# Patient Record
Sex: MALE | Race: NATIVE HAWAIIAN OR OTHER PACIFIC ISLANDER | NOT HISPANIC OR LATINO | Employment: STUDENT | URBAN - METROPOLITAN AREA
[De-identification: names, ages, dates, MRNs, and addresses within clinical notes are randomized per-mention and may not be internally consistent; named-entity substitution may affect disease eponyms.]

---

## 2023-06-11 ENCOUNTER — HOSPITAL ENCOUNTER (EMERGENCY)
Facility: CLINIC | Age: 23
Discharge: HOME | End: 2023-06-12
Attending: EMERGENCY MEDICINE | Admitting: EMERGENCY MEDICINE
Payer: COMMERCIAL

## 2023-06-11 DIAGNOSIS — L05.01 PILONIDAL ABSCESS: ICD-10-CM

## 2023-06-11 PROCEDURE — 10060 I&D ABSCESS SIMPLE/SINGLE: CPT

## 2023-06-11 PROCEDURE — 99283 EMERGENCY DEPT VISIT LOW MDM: CPT

## 2023-06-11 PROCEDURE — 250N000009 HC RX 250: Performed by: EMERGENCY MEDICINE

## 2023-06-11 RX ADMIN — Medication 3 ML: at 22:58

## 2023-06-11 ASSESSMENT — ACTIVITIES OF DAILY LIVING (ADL): ADLS_ACUITY_SCORE: 33

## 2023-06-12 VITALS
WEIGHT: 260 LBS | TEMPERATURE: 99.3 F | BODY MASS INDEX: 32.33 KG/M2 | HEIGHT: 75 IN | OXYGEN SATURATION: 97 % | HEART RATE: 95 BPM | SYSTOLIC BLOOD PRESSURE: 129 MMHG | DIASTOLIC BLOOD PRESSURE: 85 MMHG | RESPIRATION RATE: 16 BRPM

## 2023-06-12 PROCEDURE — 250N000013 HC RX MED GY IP 250 OP 250 PS 637: Performed by: EMERGENCY MEDICINE

## 2023-06-12 PROCEDURE — 87070 CULTURE OTHR SPECIMN AEROBIC: CPT | Performed by: EMERGENCY MEDICINE

## 2023-06-12 PROCEDURE — 87077 CULTURE AEROBIC IDENTIFY: CPT | Performed by: EMERGENCY MEDICINE

## 2023-06-12 RX ORDER — DOXYCYCLINE 100 MG/1
100 CAPSULE ORAL 2 TIMES DAILY
Qty: 20 CAPSULE | Refills: 0 | Status: SHIPPED | OUTPATIENT
Start: 2023-06-12 | End: 2023-06-22

## 2023-06-12 RX ORDER — DOXYCYCLINE 100 MG/1
100 CAPSULE ORAL ONCE
Status: COMPLETED | OUTPATIENT
Start: 2023-06-12 | End: 2023-06-12

## 2023-06-12 RX ADMIN — DOXYCYCLINE HYCLATE 100 MG: 100 CAPSULE ORAL at 00:07

## 2023-06-12 NOTE — ED TRIAGE NOTES
Patient presents with nickel sized area of redness and swelling near sacrum that started 2 days ago.  Denies fevers.     Triage Assessment     Row Name 06/11/23 8590       Triage Assessment (Adult)    Airway WDL WDL       Respiratory WDL    Respiratory WDL WDL       Skin Circulation/Temperature WDL    Skin Circulation/Temperature WDL WDL       Cardiac WDL    Cardiac WDL WDL       Peripheral/Neurovascular WDL    Peripheral Neurovascular WDL WDL       Cognitive/Neuro/Behavioral WDL    Cognitive/Neuro/Behavioral WDL WDL

## 2023-06-12 NOTE — ED NOTES
Pt provided extensive education regarding wound care and future practices regarding avoiding infection.  Pt. Verbalized understanding and thanked writer.

## 2023-06-12 NOTE — ED PROVIDER NOTES
"  History   Chief Complaint:  Wound Check    The history is provided by the patient.      Dg Lucero is a 23 year old male who presents for a wound check. The patient reports that he has had a red yang on the top of his buttock for a few days which has been increasing in pain. He called his nurse line who advised him to come in to be seen. No fever, chills, or nausea. He states that nothing provoked it but he does have some acne around it.  No prior skin or staph infections.    Review of External Notes:   Dermatology note reviewed from July 1, 2022 and the patient was seen for screening.    Medications:    The patient is currently on no regular medications.     Past Medical History:    The patient denies any past medical history.      Physical Exam     Patient Vitals for the past 24 hrs:   BP Temp Temp src Pulse Resp SpO2 Height Weight   06/12/23 0014 129/85 -- -- 95 16 -- -- --   06/11/23 2241 (!) 143/67 99.3  F (37.4  C) Temporal 90 16 97 % 1.905 m (6' 3\") 117.9 kg (260 lb)      Physical Exam  Constitutional:       General: He is not in acute distress.     Appearance: Normal appearance. He is not toxic-appearing.   HENT:      Head: Atraumatic.   Eyes:      General: No scleral icterus.     Conjunctiva/sclera: Conjunctivae normal.   Cardiovascular:      Rate and Rhythm: Normal rate.      Heart sounds: Normal heart sounds.   Pulmonary:      Effort: Pulmonary effort is normal. No respiratory distress.   Abdominal:      Palpations: Abdomen is soft.      Tenderness: There is no abdominal tenderness.   Musculoskeletal:         General: No deformity.      Cervical back: Neck supple.   Skin:     General: Skin is warm.      Capillary Refill: Capillary refill takes less than 2 seconds.      Comments: There is a pilonidal abscess at the superior gluteal cleft with surrounding erythema and tenderness.   Neurological:      General: No focal deficit present.      Mental Status: He is alert and oriented to person, place, and " time.   Psychiatric:         Mood and Affect: Mood normal.         Behavior: Behavior normal.           Emergency Department Course     Procedures     Incision and Drainage     Procedure: Incision and Drainage     Consent: Verbal    Indication: Abscess    Location: Pilonidal abscess    Size: 2 cm    Preparation: Povidone-iodine     Anesthesia/Sedation: Topical -LET and Lidocaine - 1%     Procedure Detail:      Incision Type: Single straight  Scalpel: 11  Lesion Management: Probed and deloculated   Wound Management: Drain placed   Packing: Gauze, 1/2 inch iodoform     Patient Status: The patient tolerated the procedure well: Yes. There were no complications.    Emergency Department Course & Assessments:  Interventions:  Medications   lido-EPINEPHrine-tetracaine (LET) topical gel GEL (3 mLs Topical $Given 6/11/23 2258)   doxycycline hyclate (VIBRAMYCIN) capsule 100 mg (100 mg Oral $Given 6/12/23 0007)      Assessments:  2252 I obtained history and examined the patient as reported above.   2346 I rechecked the patient and completed an incision and drainage. See full procedure note above.     Disposition:  The patient was discharged to home.     Impression & Plan    Medical Decision Making:  This patient presents with a pilonidal abscess.  The area was anesthetized and he underwent I&D as described.  There was a copious amount of purulent material expressed.  Loculations were broken up and the wound was packed.  He was advised to follow-up in 2 days for removal of packing and recheck of the wound.  He was initiated on doxycycline.  Wound culture was sent.    Diagnosis:    ICD-10-CM    1. Pilonidal abscess  L05.01            Discharge Medications:  New Prescriptions    DOXYCYCLINE HYCLATE (VIBRAMYCIN) 100 MG CAPSULE    Take 1 capsule (100 mg) by mouth 2 times daily for 10 days      Scribe Disclosure:  Jorge AYALA, rishi serving as a scribe at 10:55 PM on 6/11/2023 to document services personally performed by  Dominick Medina MD based on my observations and the provider's statements to me.      6/11/2023   Dominick Medina MD McRoberts, Sean Edward, MD  06/12/23 0021

## 2023-06-12 NOTE — DISCHARGE INSTRUCTIONS
Follow-up with your primary care clinic, return to the ER, or go to urgent care clinic in 2 days for reevaluation of the abscess and removal of the packing material.    Return to the ER immediately for fever, increased pain, increased swelling or redness around the wound, or for any other concerns.    If the packing material inadvertently falls out on its own you should leave it out.

## 2023-06-13 ENCOUNTER — TELEPHONE (OUTPATIENT)
Dept: EMERGENCY MEDICINE | Facility: CLINIC | Age: 23
End: 2023-06-13
Payer: COMMERCIAL

## 2023-06-13 NOTE — TELEPHONE ENCOUNTER
[facility] Emergency Department Lab result notification     Caller/Patient name  Dg    Reason for call  Patient requesting lab result    Lab Result   Wound Aerobic culture still in progress  Current symptoms    Recommendations/Instructions  Discussed how he would receive positive results and that negative results are not called to patient. He will contact [facility] to access his results.    Contact your PCP clinic or return to the Emergency department if your:    Symptoms return.    Symptoms do not improve after 3 days on antibiotic.    Symptoms do not resolve after completing antibiotic.    Symptoms worsen or other concerning symptom's.      Mere Barron, SUNNI  [facility]  Emergency Dept Lab Result RN  Ph# [phone]     Copy of Lab result

## 2023-06-13 NOTE — TELEPHONE ENCOUNTER
[facility] Emergency Department/Urgent Care Lab result notification:    Reason for call    Notify the patient/parent of lab results    Assess patient symptoms (if applicable)    Review ED providers recommendations/discharge instructions (if necessary)    Advise per [facility] ED lab result protocol    Lab result  Patient requesting wound culture report if there are any updates as he is going to the [facility] urgent care for re-check and packing removal.  Relayed preliminary growth of Streptococcus constellatus so that he may provide this information to his provider. Did express that this is preliminary only and other bacteria maybe present upon final, and patient stated understanding.  Mere Barron, RN  [facility] Result RN  [facility] Emergency Dept Lab Result RN  Ph# [phone]

## 2023-06-19 ENCOUNTER — TELEPHONE (OUTPATIENT)
Dept: EMERGENCY MEDICINE | Facility: CLINIC | Age: 23
End: 2023-06-19

## 2023-06-19 LAB
BACTERIA ABSC ANAEROBE+AEROBE CULT: ABNORMAL
GRAM STAIN RESULT: ABNORMAL

## 2023-06-19 NOTE — RESULT ENCOUNTER NOTE
Left voicemail message requesting a call back to [facility] ED Lab Result RN at [phone].  RN is available every day between 9 a.m. and 5:30 p.m.  See Telephone encounter..

## 2023-06-19 NOTE — RESULT ENCOUNTER NOTE
Final Abscess Aerobic Bacterial Culture (specimen - Gluteal cleft buttock) report on 6/19/23  [facility] Emergency Dept discharge antibiotic prescribed: Doxycycline 100 mg PO tablet, 1 tablet (100 mg) by mouth 2 times daily for 10 days  #1. Bacteria, Streptococcus constellatus  Incision and Drainage performed in the [place] ED: Yes  Recommendations in treatment per [facility] ED lab result culture protocol

## 2023-06-19 NOTE — TELEPHONE ENCOUNTER
[facility] Emergency Department/Urgent Care Lab result notification  [Note:  ED Lab Results RN will reference the [facility] Emergency Dept visit note prior to contacting patient AND/OR prior to consulting Emergency Dept Provider.  Highlights of Emergency Dept visit in information summary at the bottom of this telephone note]    1. Reason for call    Notify of lab results    Assess patient symptoms [if necessary]    Review ED Providers recommendations/discharge instructions (if necessary)    Advise per [facility] ED lab result protocol    2. Lab Result (including Rx patient on, if applicable).  If culture, copy of lab report at bottom.  Final Abscess Aerobic Bacterial Culture (specimen - Gluteal cleft buttock) report on 6/19/23  [facility] Emergency Dept discharge antibiotic prescribed: Doxycycline 100 mg PO tablet, 1 tablet (100 mg) by mouth 2 times daily for 10 days  #1. Bacteria, Streptococcus constellatus  Incision and Drainage performed in the [place] ED: Yes  Recommendations in treatment per [facility] lab result culture protocol    3. RN Assessment (Patient's current Symptoms):    Time of call: 5:03P;  Left message    4. RN Recommendations/Instructions per [place] ED lab result protocol    [facility] ED lab result protocol used: zi Conte was not notified of lab result and treatment recommendations  Left voicemail message requesting a call back to [facility] ED Lab Result RN at [phone].  RN is available every day between 9 a.m. and 5:30 p.m.      Information summary from Emergency Dept/Urgent Care visit on 6/11/23  Symptoms reported at ED visit (Chief complaint, HPI) Chief Complaint:  Wound Check     The history is provided by the patient.      Dg Lucero is a 23 year old male who presents for a wound check. The patient reports that he has had a red yang on the top of his buttock for a few days which has been increasing in pain. He called his  nurse line who advised him to come in to be seen. No fever, chills, or nausea. He states that nothing provoked it but he does have some acne around it.  No prior skin or staph infections.   ED providers Impression and Plan (applicable information) Medical Decision Making:  This patient presents with a pilonidal abscess.  The area was anesthetized and he underwent I&D as described.  There was a copious amount of purulent material expressed.  Loculations were broken up and the wound was packed.  He was advised to follow-up in 2 days for removal of packing and recheck of the wound.  He was initiated on doxycycline.  Wound culture was sent.   Miscellaneous   Information (ED Provider, diagnosis, etc)   Dominick Medina MD  Pilonidal abscess        Copy of Lab report (if applicable)        Richard Gore RN  [facility]  Emergency Dept Lab Result RN  Ph# [phone]